# Patient Record
(demographics unavailable — no encounter records)

---

## 2025-03-04 NOTE — PROCEDURE
[Anterior rhinoscopy insufficient to account for symptoms] : anterior rhinoscopy insufficient to account for symptoms [Flexible Endoscope] : examined with the flexible endoscope [Congested] : congested [Nasal Mucosa Crusts / Sores] : no lesions [Nasal Mucosa] : no polyps [Nasal Septum] : no lesions [Nasal Septum Mucosa Bleeding] : no bleeding [] : hypertrophy present bilateral [Normal] : the nasopharynx was normal [FreeTextEntry6] : findings: bilateral inferior turbinate hypertrophy

## 2025-03-04 NOTE — PHYSICAL EXAM
[Nasal Endoscopy Performed] : nasal endoscopy was performed, see procedure section for findings [Midline] : trachea located in midline position [Normal] : assessment of respiratory effort is normal [de-identified] : hypertrophy

## 2025-03-04 NOTE — HISTORY OF PRESENT ILLNESS
[de-identified] : 33 y/o F p/w sinus congestion for over a month s/p URI. She reports sinus pressure mostly on L>R, maxillary and frontal. She has used allegra with no relief. H/o seasonal allergies. reports development of wheezing, and chest tightness x4-5 days ago, was seen in ER, treated with albuterol with some relief. OTC: afrin, prednisone (50mg x5 days), sudafed. Clear nasal discharge. Sinus infections x2 times a year.

## 2025-04-01 NOTE — ASSESSMENT
[FreeTextEntry1] : 1. sinusitis -CT: bilateral maxillary sinus opacification, ethmoid L sinus opacification, turbinate hypertrophy bilateral -results reviewed with patient -plan for maxillary balloon dilation, bilateral; anterior ethmoiectomy -risks/benefits/alternatives discussed -she wished to proceed

## 2025-04-01 NOTE — HISTORY OF PRESENT ILLNESS
[de-identified] : 1 month follow up visit for this 33 y/o F. Reports persistent sinus pressure L>R. She has been using allegra D with no relief. Has difficulty with saline rinses. Reports clogged sensation to ear when yawning.

## 2025-04-01 NOTE — DATA REVIEWED
[de-identified] : CT: bilateral maxillary sinus opacification, ethmoid L sinus opacification, turbinate hypertrophy bilateral -results reviewed with patient

## 2025-04-15 NOTE — HISTORY OF PRESENT ILLNESS
[de-identified] : 2 week F/U for this 33yo F with h/o chronic sinusitis, sinus pressure. CT sinus showed: bilateral maxillary sinus opacification, ethmoid L sinus opacification, turbinate hypertrophy bilateral. Patient here today for in office procedure: bilateral Maxillary sinus balloon dilatation & Anterior ethmoidectomy.

## 2025-04-15 NOTE — HISTORY OF PRESENT ILLNESS
[de-identified] : 2 week F/U for this 33yo F with h/o chronic sinusitis, sinus pressure. CT sinus showed: bilateral maxillary sinus opacification, ethmoid L sinus opacification, turbinate hypertrophy bilateral. Patient here today for in office procedure: bilateral Maxillary sinus balloon dilatation & Anterior ethmoidectomy.

## 2025-04-15 NOTE — PROCEDURE
[FreeTextEntry1] : B/L Maxillary sinus balloon dilatation & Anterior ethmoidectomy  [FreeTextEntry2] : Chronic sinusitis [FreeTextEntry3] : Procedure note: After informed verbal consent is obtained, pledgets soaked with a 1:1 mix of 0.05% oxymetazoline and 4% lidocaine was applied to right middle meatus, right anterior nasal mucosa for 5-10 minutes. The patient was given protective eyeglasses to wear. Pledgets then were removed with bayonnet forceps. Pledgets soaked in 1:1 ratio of 2.5% liodcaine and 2.5% prilocaine was then applied to right middle meatus and right posterior inferior turbinate for 2 minutes. Pledgets were then removed with bayonnet forceps. With 0 degree video endoscope guidance, local anesthesia was administered adjacent to maxillary ostium with 2% lidocaine + epi injection (1:100,000). Using 0 degree rigid video endoscope throughout the rest of the procedure, r maxillary antrostomy was performed using a curved suction free hand. Blood and sinus drainage was then suctioned. Hemostasis was achieved. Video endoscope was removed. Bacitracin was applied to r nares with a tip dressing. Patient was stable and well during and after the procedure. Patient understood all post-op education material provided.Procedure note: After informed verbal consent is obtained, pledgets soaked with a 1:1 mix of 0.05% oxymetazoline and 4% lidocaine was applied to right middle meatus, right anterior nasal mucosa for 5-10 minutes. The patient was given protective eyeglasses to wear. Pledgets then were removed with bayonnet forceps. Pledgets soaked in 1:1 ratio of 2.5% liodcaine and 2.5% prilocaine was then applied to right middle meatus and right posterior inferior turbinate for 2 minutes. Pledgets were then removed with bayonnet forceps. With 0 degree video endoscope guidance, local anesthesia was administered adjacent to maxillary ostium with 2% lidocaine + epi injection (1:100,000). Using 0 degree rigid video endoscope throughout the rest of the procedure, r maxillary antrostomy was performed using a curved suction free hand. Blood and sinus drainage was then suctioned. Hemostasis was achieved. Video endoscope was removed. Bacitracin was applied to r nares with a tip dressing. Patient was stable and well during and after the procedure. Patient understood all post-op education material provided.

## 2025-04-15 NOTE — REASON FOR VISIT
[Procedure Visit: _____] : [unfilled]  [FreeTextEntry2] : ENT procedure: bilateral Maxillary sinus balloon dilatation & Anterior ethmoidectomy

## 2025-04-29 NOTE — REASON FOR VISIT
[Post-Operative Visit] : a post-operative visit [FreeTextEntry2] : bilateral maxillary sinus balloon dilation, anterior ethmoidectomy

## 2025-04-29 NOTE — HISTORY OF PRESENT ILLNESS
[de-identified] : 2 week post op visit for this 35 y/o F s/o bilateral maxillary sinus balloon dilation, anterior ethmoidectomy for tx of chronic sinusitis. Reports headache for a few days, resolved on its own. Denies epistaxis, denies fever. Reports that she feels significant improvement in her congestion.

## 2025-04-29 NOTE — ASSESSMENT
[FreeTextEntry1] : 1. 2 week post op check s/p bilateral maxillary balloon sinuplasty, anterior ethmoidectomy -doing well -no secondary infection noted -saline rinses -RTC in x3-4 mo or earlier as needed